# Patient Record
Sex: MALE
[De-identification: names, ages, dates, MRNs, and addresses within clinical notes are randomized per-mention and may not be internally consistent; named-entity substitution may affect disease eponyms.]

---

## 2024-05-30 ENCOUNTER — APPOINTMENT (OUTPATIENT)
Dept: ORTHOPEDIC SURGERY | Facility: CLINIC | Age: 44
End: 2024-05-30
Payer: COMMERCIAL

## 2024-05-30 ENCOUNTER — NON-APPOINTMENT (OUTPATIENT)
Age: 44
End: 2024-05-30

## 2024-05-30 DIAGNOSIS — Z78.9 OTHER SPECIFIED HEALTH STATUS: ICD-10-CM

## 2024-05-30 DIAGNOSIS — S93.401A SPRAIN OF UNSPECIFIED LIGAMENT OF RIGHT ANKLE, INITIAL ENCOUNTER: ICD-10-CM

## 2024-05-30 PROBLEM — Z00.00 ENCOUNTER FOR PREVENTIVE HEALTH EXAMINATION: Status: ACTIVE | Noted: 2024-05-30

## 2024-05-30 PROCEDURE — 99203 OFFICE O/P NEW LOW 30 MIN: CPT

## 2024-05-30 PROCEDURE — 73610 X-RAY EXAM OF ANKLE: CPT | Mod: RT

## 2024-05-30 NOTE — DISCUSSION/SUMMARY
[de-identified] : At this time his symptoms are completely resolved.  He can do activity as tolerated.  We will see him back as needed. Patient will call me if any other problems or concerns.  Patient verbalized understanding and agreed with the plan, all questions were answered in the office today.

## 2024-05-30 NOTE — IMAGING
[de-identified] : On examination of his right ankle he has no erythema, no swelling, no ecchymosis.  No tenderness over the medial or lateral malleolus.  No tenderness over the ATFL CFL PTFL or deltoid ligament.  No tenderness over the talar dome.  No tenderness over the Achilles or the calcaneus, negative Cadet's test, no calf tenderness.  He has no tenderness over the midfoot or the metatarsals.  No tenderness over the Lisfranc joint.  He has full range of motion of the ankle, sensation is intact throughout, 2+ DP and PT pulses.  He is walking with a nonantalgic gait.  X-rays taken in the office today of the right ankle show no fractures, dislocations, or other bony abnormalities.

## 2024-05-30 NOTE — HISTORY OF PRESENT ILLNESS
[de-identified] : 44-year-old male is here today for evaluation of his right ankle.  Patient states back in March, on March 11.  He was walking and he rolled his ankle.  He was having a lot of pain and swelling in the ankle for about a week or 2.  He states after that the pain resolved and he has been feeling better.  He did not have an x-ray or evaluation at the time of the injury.  He states he had missed some work at that time so he wanted to come in today to have it checked.  He is currently in no pain.  He denies any new injury or trauma.  He denies any numbness tingling or any calf pain.